# Patient Record
Sex: FEMALE | Race: BLACK OR AFRICAN AMERICAN | NOT HISPANIC OR LATINO | URBAN - METROPOLITAN AREA
[De-identification: names, ages, dates, MRNs, and addresses within clinical notes are randomized per-mention and may not be internally consistent; named-entity substitution may affect disease eponyms.]

---

## 2018-07-26 ENCOUNTER — EMERGENCY (EMERGENCY)
Facility: HOSPITAL | Age: 43
LOS: 1 days | Discharge: HOME | End: 2018-07-26
Attending: EMERGENCY MEDICINE | Admitting: EMERGENCY MEDICINE
Payer: COMMERCIAL

## 2018-07-26 VITALS
RESPIRATION RATE: 16 BRPM | DIASTOLIC BLOOD PRESSURE: 72 MMHG | HEART RATE: 79 BPM | SYSTOLIC BLOOD PRESSURE: 120 MMHG | OXYGEN SATURATION: 100 %

## 2018-07-26 VITALS
DIASTOLIC BLOOD PRESSURE: 81 MMHG | TEMPERATURE: 98 F | HEART RATE: 82 BPM | SYSTOLIC BLOOD PRESSURE: 141 MMHG | OXYGEN SATURATION: 100 % | RESPIRATION RATE: 18 BRPM

## 2018-07-26 LAB
ALBUMIN SERPL ELPH-MCNC: 4 G/DL — SIGNIFICANT CHANGE UP (ref 3.3–5)
ALP SERPL-CCNC: 84 U/L — SIGNIFICANT CHANGE UP (ref 40–120)
ALT FLD-CCNC: 14 U/L — SIGNIFICANT CHANGE UP (ref 4–33)
AST SERPL-CCNC: 25 U/L — SIGNIFICANT CHANGE UP (ref 4–32)
BASOPHILS # BLD AUTO: 0.02 K/UL — SIGNIFICANT CHANGE UP (ref 0–0.2)
BASOPHILS NFR BLD AUTO: 0.3 % — SIGNIFICANT CHANGE UP (ref 0–2)
BILIRUB SERPL-MCNC: 0.3 MG/DL — SIGNIFICANT CHANGE UP (ref 0.2–1.2)
BUN SERPL-MCNC: 16 MG/DL — SIGNIFICANT CHANGE UP (ref 7–23)
CALCIUM SERPL-MCNC: 9.2 MG/DL — SIGNIFICANT CHANGE UP (ref 8.4–10.5)
CHLORIDE SERPL-SCNC: 104 MMOL/L — SIGNIFICANT CHANGE UP (ref 98–107)
CO2 SERPL-SCNC: 24 MMOL/L — SIGNIFICANT CHANGE UP (ref 22–31)
CREAT SERPL-MCNC: 1.04 MG/DL — SIGNIFICANT CHANGE UP (ref 0.5–1.3)
D DIMER BLD IA.RAPID-MCNC: 177 NG/ML — SIGNIFICANT CHANGE UP
EOSINOPHIL # BLD AUTO: 0.06 K/UL — SIGNIFICANT CHANGE UP (ref 0–0.5)
EOSINOPHIL NFR BLD AUTO: 0.9 % — SIGNIFICANT CHANGE UP (ref 0–6)
GLUCOSE SERPL-MCNC: 81 MG/DL — SIGNIFICANT CHANGE UP (ref 70–99)
HCT VFR BLD CALC: 43.8 % — SIGNIFICANT CHANGE UP (ref 34.5–45)
HGB BLD-MCNC: 14.3 G/DL — SIGNIFICANT CHANGE UP (ref 11.5–15.5)
IMM GRANULOCYTES # BLD AUTO: 0.03 # — SIGNIFICANT CHANGE UP
IMM GRANULOCYTES NFR BLD AUTO: 0.4 % — SIGNIFICANT CHANGE UP (ref 0–1.5)
LYMPHOCYTES # BLD AUTO: 2.4 K/UL — SIGNIFICANT CHANGE UP (ref 1–3.3)
LYMPHOCYTES # BLD AUTO: 34.5 % — SIGNIFICANT CHANGE UP (ref 13–44)
MCHC RBC-ENTMCNC: 27.1 PG — SIGNIFICANT CHANGE UP (ref 27–34)
MCHC RBC-ENTMCNC: 32.6 % — SIGNIFICANT CHANGE UP (ref 32–36)
MCV RBC AUTO: 83.1 FL — SIGNIFICANT CHANGE UP (ref 80–100)
MONOCYTES # BLD AUTO: 0.73 K/UL — SIGNIFICANT CHANGE UP (ref 0–0.9)
MONOCYTES NFR BLD AUTO: 10.5 % — SIGNIFICANT CHANGE UP (ref 2–14)
NEUTROPHILS # BLD AUTO: 3.71 K/UL — SIGNIFICANT CHANGE UP (ref 1.8–7.4)
NEUTROPHILS NFR BLD AUTO: 53.4 % — SIGNIFICANT CHANGE UP (ref 43–77)
NRBC # FLD: 0 — SIGNIFICANT CHANGE UP
PLATELET # BLD AUTO: 286 K/UL — SIGNIFICANT CHANGE UP (ref 150–400)
PMV BLD: 9.3 FL — SIGNIFICANT CHANGE UP (ref 7–13)
POTASSIUM SERPL-MCNC: 4.9 MMOL/L — SIGNIFICANT CHANGE UP (ref 3.5–5.3)
POTASSIUM SERPL-SCNC: 4.9 MMOL/L — SIGNIFICANT CHANGE UP (ref 3.5–5.3)
PROT SERPL-MCNC: 8.2 G/DL — SIGNIFICANT CHANGE UP (ref 6–8.3)
RBC # BLD: 5.27 M/UL — HIGH (ref 3.8–5.2)
RBC # FLD: 14.1 % — SIGNIFICANT CHANGE UP (ref 10.3–14.5)
SODIUM SERPL-SCNC: 138 MMOL/L — SIGNIFICANT CHANGE UP (ref 135–145)
WBC # BLD: 6.95 K/UL — SIGNIFICANT CHANGE UP (ref 3.8–10.5)
WBC # FLD AUTO: 6.95 K/UL — SIGNIFICANT CHANGE UP (ref 3.8–10.5)

## 2018-07-26 PROCEDURE — 99284 EMERGENCY DEPT VISIT MOD MDM: CPT

## 2018-07-26 PROCEDURE — 71046 X-RAY EXAM CHEST 2 VIEWS: CPT | Mod: 26

## 2018-07-26 PROCEDURE — 71250 CT THORAX DX C-: CPT | Mod: 26

## 2018-07-26 RX ORDER — IPRATROPIUM/ALBUTEROL SULFATE 18-103MCG
3 AEROSOL WITH ADAPTER (GRAM) INHALATION ONCE
Qty: 0 | Refills: 0 | Status: COMPLETED | OUTPATIENT
Start: 2018-07-26 | End: 2018-07-26

## 2018-07-26 RX ORDER — KETOROLAC TROMETHAMINE 30 MG/ML
30 SYRINGE (ML) INJECTION ONCE
Qty: 0 | Refills: 0 | Status: DISCONTINUED | OUTPATIENT
Start: 2018-07-26 | End: 2018-07-26

## 2018-07-26 RX ADMIN — Medication 30 MILLIGRAM(S): at 15:42

## 2018-07-26 RX ADMIN — Medication 3 MILLILITER(S): at 15:42

## 2018-07-26 NOTE — ED ADULT TRIAGE NOTE - CHIEF COMPLAINT QUOTE
Pt c/o pain between the shoulder blades worse upon exertion, b/l leg swelling and chest pain x 5 days. Pt reports recent car trip to [place] 2 weeks ago. Resp even and unlabored. Non smoker, no OCP's.

## 2018-07-26 NOTE — ED PROVIDER NOTE - PROGRESS NOTE DETAILS
Symptoms improved w neb. CXR with Focal small indistinct nodular opacity in right lung base, recommended CT chest. ADRIANE Veloz MD MD CHO:  I received s/o on this pt.  Likely mild asthma exac.  No steroids per s/o, sx relived with one duoneb.  Pending ct chest d/t abn cxr.  Completed, will dc home with pcp f/u.

## 2018-07-26 NOTE — ED PROVIDER NOTE - MEDICAL DECISION MAKING DETAILS
43F with pleurtic upper back pain, cough and SOB.  No wheezing, h/o asthma, will give neb. Plan for CXR, low risk PE, plan for d-dimer. Consider infxn vs asthma exacerbation.

## 2018-07-26 NOTE — ED SUB INTERN NOTE - MEDICAL DECISION MAKING DETAILS
Differential includes asthma exacerbation vs. PE vs. musculoskeletal pain. Laboratory tests include CBC, CMP, urine pregnancy POCT, and D-dimer. Imaging includes CXR. 12-lead ECG to be performed. In the meantime, albuterol/ipratropium nebulizer for SOB is ordered with ketorolac for R back pain.

## 2018-07-26 NOTE — ED SUB INTERN NOTE - ENMT, MLM
Airway patent. Nasal mucosa clear. Mouth with normal mucosa. Throat has no vesicles, no oropharyngeal exudates and uvula is midline. Decreased voice volume.

## 2018-07-26 NOTE — ED ADULT NURSE REASSESSMENT NOTE - REASSESS COMMUNICATION
discharged with instructions by provider, sl removed intact prior to discharge/ED physician notified

## 2018-07-26 NOTE — ED ADULT NURSE REASSESSMENT NOTE - GENERAL PATIENT STATE
comfortable appearance/improvement verbalized/family/SO at bedside/states she feels alittle better than when she came in

## 2018-07-26 NOTE — ED SUB INTERN NOTE - CARDIAC, MLM
Normal rate, regular rhythm.  Heart sounds S1, S2.  No murmurs, rubs or gallops. No swelling in BLLE.

## 2018-07-26 NOTE — ED ADULT NURSE NOTE - OBJECTIVE STATEMENT
Patient reports intermittent CP associated with asthma attack. Patient used inhaler w/o relief. Patient currently able to breathe without difficulty, appears NAD. Patient's 12 lead EKG shows NSR, patient denies any significant cardiac hx. Patient's labs drawn, nebulizer tx given, awaiting results.

## 2018-07-26 NOTE — ED ADULT NURSE NOTE - CHPI ED SYMPTOMS NEG
no vomiting/no diaphoresis/no dizziness/no fever/no chills/no cough/no back pain/no syncope/no nausea

## 2018-07-26 NOTE — ED SUB INTERN NOTE - OBJECTIVE STATEMENT FT
Ms. Jimenez is a 43 y.o. F with a PMHx of asthma, chronic back/neck pain, and GERD, who presents with R upper back with SOB and cough since 4 days ago. She rates the pain as a sharp pain that comes and goes and is between 5-8/10. It does not radiate to the chest. She also reports developing a cough with varying productiveness since Monday, but has not noticed blood. She has had SOB since Monday and pain with deep breathing. Yesterday, she describes that she started losing her voice. She tried using her flovent and ventolin for her sx and reports moderate relief.  2 weeks ago, she went on an 18-hour car ride and developed BL leg swelling. She says this isn't unusual for her during long trips. She has also vomited 3x this week, but she attributes this to her longstanding GERD and this is also not unusual for her.

## 2018-07-26 NOTE — ED PROVIDER NOTE - OBJECTIVE STATEMENT
43F h/o asthma, chronic back pain, GERD presents with upper back pain  Pain started in upper back 4d ago, started at rest. Pain is intermittent, no radiation. Also with cough and SOB.   Tried using inhalers with minimal relief.    Pain is worse w movement of arm and deep breath  Reports recent long road trip, after which developed some b/l leg swelling, that has since resolved  No CP 43F h/o asthma, chronic back pain, GERD presents with upper back pain. Pain started in upper back 4d ago, started at rest. Pain is intermittent, no radiation. Also with cough and SOB. Tried using inhalers with minimal relief.  Pain is worse w movement of arm and deep breath. Reports recent long road trip, after which developed some b/l leg swelling, that has since resolved. No anterior CP, no fever.

## 2020-07-14 PROBLEM — J45.909 UNSPECIFIED ASTHMA, UNCOMPLICATED: Chronic | Status: ACTIVE | Noted: 2018-07-26

## 2020-08-11 ENCOUNTER — FORM ENCOUNTER (OUTPATIENT)
Age: 45
End: 2020-08-11

## 2020-08-12 ENCOUNTER — RESULT REVIEW (OUTPATIENT)
Age: 45
End: 2020-08-12

## 2020-08-12 ENCOUNTER — APPOINTMENT (OUTPATIENT)
Dept: OBGYN | Facility: CLINIC | Age: 45
End: 2020-08-12
Payer: COMMERCIAL

## 2020-08-12 ENCOUNTER — FORM ENCOUNTER (OUTPATIENT)
Age: 45
End: 2020-08-12

## 2020-08-12 PROBLEM — Z00.00 ENCOUNTER FOR PREVENTIVE HEALTH EXAMINATION: Status: ACTIVE | Noted: 2020-08-12

## 2020-08-12 PROCEDURE — 99386 PREV VISIT NEW AGE 40-64: CPT

## 2020-08-12 PROCEDURE — 99213 OFFICE O/P EST LOW 20 MIN: CPT | Mod: 25

## 2020-08-20 ENCOUNTER — RESULT REVIEW (OUTPATIENT)
Age: 45
End: 2020-08-20

## 2020-08-20 ENCOUNTER — APPOINTMENT (OUTPATIENT)
Dept: ULTRASOUND IMAGING | Facility: CLINIC | Age: 45
End: 2020-08-20
Payer: COMMERCIAL

## 2020-08-20 ENCOUNTER — OUTPATIENT (OUTPATIENT)
Dept: OUTPATIENT SERVICES | Facility: HOSPITAL | Age: 45
LOS: 1 days | End: 2020-08-20
Payer: COMMERCIAL

## 2020-08-20 ENCOUNTER — TRANSCRIPTION ENCOUNTER (OUTPATIENT)
Age: 45
End: 2020-08-20

## 2020-08-20 DIAGNOSIS — Z00.8 ENCOUNTER FOR OTHER GENERAL EXAMINATION: ICD-10-CM

## 2020-08-20 PROCEDURE — 76830 TRANSVAGINAL US NON-OB: CPT

## 2020-08-20 PROCEDURE — 76830 TRANSVAGINAL US NON-OB: CPT | Mod: 26

## 2020-08-20 PROCEDURE — 76856 US EXAM PELVIC COMPLETE: CPT | Mod: 26

## 2020-08-20 PROCEDURE — 76856 US EXAM PELVIC COMPLETE: CPT

## 2020-09-24 ENCOUNTER — FORM ENCOUNTER (OUTPATIENT)
Age: 45
End: 2020-09-24

## 2020-09-25 ENCOUNTER — APPOINTMENT (OUTPATIENT)
Dept: OBGYN | Facility: CLINIC | Age: 45
End: 2020-09-25
Payer: COMMERCIAL

## 2020-09-25 ENCOUNTER — RESULT REVIEW (OUTPATIENT)
Age: 45
End: 2020-09-25

## 2020-09-25 PROCEDURE — 76831 ECHO EXAM UTERUS: CPT

## 2020-09-25 PROCEDURE — 81025 URINE PREGNANCY TEST: CPT

## 2020-09-25 PROCEDURE — 58100 BIOPSY OF UTERUS LINING: CPT

## 2020-10-09 ENCOUNTER — OUTPATIENT (OUTPATIENT)
Dept: OUTPATIENT SERVICES | Facility: HOSPITAL | Age: 45
LOS: 1 days | End: 2020-10-09
Payer: COMMERCIAL

## 2020-10-09 VITALS
WEIGHT: 272.93 LBS | RESPIRATION RATE: 16 BRPM | TEMPERATURE: 98 F | SYSTOLIC BLOOD PRESSURE: 116 MMHG | OXYGEN SATURATION: 99 % | DIASTOLIC BLOOD PRESSURE: 73 MMHG | HEART RATE: 72 BPM | HEIGHT: 67 IN

## 2020-10-09 DIAGNOSIS — N84.0 POLYP OF CORPUS UTERI: ICD-10-CM

## 2020-10-09 DIAGNOSIS — M51.26 OTHER INTERVERTEBRAL DISC DISPLACEMENT, LUMBAR REGION: Chronic | ICD-10-CM

## 2020-10-09 DIAGNOSIS — Z01.818 ENCOUNTER FOR OTHER PREPROCEDURAL EXAMINATION: ICD-10-CM

## 2020-10-09 DIAGNOSIS — Z98.890 OTHER SPECIFIED POSTPROCEDURAL STATES: Chronic | ICD-10-CM

## 2020-10-09 LAB
ANION GAP SERPL CALC-SCNC: 11 MMOL/L — SIGNIFICANT CHANGE UP (ref 5–17)
BUN SERPL-MCNC: 9 MG/DL — SIGNIFICANT CHANGE UP (ref 7–23)
CALCIUM SERPL-MCNC: 9.2 MG/DL — SIGNIFICANT CHANGE UP (ref 8.4–10.5)
CHLORIDE SERPL-SCNC: 102 MMOL/L — SIGNIFICANT CHANGE UP (ref 96–108)
CO2 SERPL-SCNC: 24 MMOL/L — SIGNIFICANT CHANGE UP (ref 22–31)
CREAT SERPL-MCNC: 0.71 MG/DL — SIGNIFICANT CHANGE UP (ref 0.5–1.3)
GLUCOSE SERPL-MCNC: 92 MG/DL — SIGNIFICANT CHANGE UP (ref 70–99)
HCT VFR BLD CALC: 45.4 % — HIGH (ref 34.5–45)
HGB BLD-MCNC: 14.7 G/DL — SIGNIFICANT CHANGE UP (ref 11.5–15.5)
MCHC RBC-ENTMCNC: 27.7 PG — SIGNIFICANT CHANGE UP (ref 27–34)
MCHC RBC-ENTMCNC: 32.4 GM/DL — SIGNIFICANT CHANGE UP (ref 32–36)
MCV RBC AUTO: 85.5 FL — SIGNIFICANT CHANGE UP (ref 80–100)
NRBC # BLD: 0 /100 WBCS — SIGNIFICANT CHANGE UP (ref 0–0)
PLATELET # BLD AUTO: 295 K/UL — SIGNIFICANT CHANGE UP (ref 150–400)
POTASSIUM SERPL-MCNC: 3.6 MMOL/L — SIGNIFICANT CHANGE UP (ref 3.5–5.3)
POTASSIUM SERPL-SCNC: 3.6 MMOL/L — SIGNIFICANT CHANGE UP (ref 3.5–5.3)
RBC # BLD: 5.31 M/UL — HIGH (ref 3.8–5.2)
RBC # FLD: 14.4 % — SIGNIFICANT CHANGE UP (ref 10.3–14.5)
SARS-COV-2 RNA SPEC QL NAA+PROBE: SIGNIFICANT CHANGE UP
SODIUM SERPL-SCNC: 137 MMOL/L — SIGNIFICANT CHANGE UP (ref 135–145)
WBC # BLD: 6.59 K/UL — SIGNIFICANT CHANGE UP (ref 3.8–10.5)
WBC # FLD AUTO: 6.59 K/UL — SIGNIFICANT CHANGE UP (ref 3.8–10.5)

## 2020-10-09 PROCEDURE — G0463: CPT

## 2020-10-09 PROCEDURE — 85027 COMPLETE CBC AUTOMATED: CPT

## 2020-10-09 PROCEDURE — 80048 BASIC METABOLIC PNL TOTAL CA: CPT

## 2020-10-09 PROCEDURE — U0003: CPT

## 2020-10-09 RX ORDER — SODIUM CHLORIDE 9 MG/ML
3 INJECTION INTRAMUSCULAR; INTRAVENOUS; SUBCUTANEOUS EVERY 8 HOURS
Refills: 0 | Status: DISCONTINUED | OUTPATIENT
Start: 2020-10-12 | End: 2020-10-26

## 2020-10-09 RX ORDER — LIDOCAINE HCL 20 MG/ML
0.2 VIAL (ML) INJECTION ONCE
Refills: 0 | Status: DISCONTINUED | OUTPATIENT
Start: 2020-10-12 | End: 2020-10-26

## 2020-10-09 NOTE — H&P PST ADULT - NSICDXPASTMEDICALHX_GEN_ALL_CORE_FT
PAST MEDICAL HISTORY:  Anemia in past secondary to vaginal bleeding    Asthma     Fluid retention     Lumbar herniated disc treated with injections    Morbid obesity     TORSTEN (obstructive sleep apnea) possible- pt noncompliant with testing    Uterine polyp

## 2020-10-09 NOTE — H&P PST ADULT - NSICDXPASTSURGICALHX_GEN_ALL_CORE_FT
PAST SURGICAL HISTORY:  Lumbar disc herniation Injection procedure under anesthesia    S/P D&C (status post dilation and curettage)

## 2020-10-09 NOTE — H&P PST ADULT - ATTENDING COMMENTS
Pt consented for D+C, diagnostic hysteroscopy, possible operative hysteroscopy and polypectomy. All questions answered.

## 2020-10-09 NOTE — H&P PST ADULT - HISTORY OF PRESENT ILLNESS
Pt is a 45y.o. BF  with LMP 2020. Pt states she has h/o fibroids and has been experiencing prolonged vaginal bleeding. She states she was bleeding x 3 months until it recently stopped. This has happened to her once in the past with secondary anemia requiring a transfusion and was treated with a D&C.  Pt states she had a vaginal sonogram, a hysterosonogram and a uterine biopsy. She is unsure if she has a polyp. She is scheduled for a diagnostic hysteroscopy, possible operative hysteroscopy and polypectomy with Dr. Fall on 10/12/2020.    Covid swab done at [facility].

## 2020-10-09 NOTE — H&P PST ADULT - NSICDXPROBLEM_GEN_ALL_CORE_FT
PROBLEM DIAGNOSES  Problem: Uterine polyp  Assessment and Plan: Pt is scheduled for a D&C, diagnostic hysteroscopy, possible operative hysteroscopy polypectomy  NPO after 11PM  Pt advised to use inhalers in AM DOS and to hold HCTZ pre-op

## 2020-10-11 ENCOUNTER — FORM ENCOUNTER (OUTPATIENT)
Age: 45
End: 2020-10-11

## 2020-10-11 ENCOUNTER — TRANSCRIPTION ENCOUNTER (OUTPATIENT)
Age: 45
End: 2020-10-11

## 2020-10-12 ENCOUNTER — APPOINTMENT (OUTPATIENT)
Dept: OBGYN | Facility: HOSPITAL | Age: 45
End: 2020-10-12

## 2020-10-12 ENCOUNTER — RESULT REVIEW (OUTPATIENT)
Age: 45
End: 2020-10-12

## 2020-10-12 ENCOUNTER — OUTPATIENT (OUTPATIENT)
Dept: OUTPATIENT SERVICES | Facility: HOSPITAL | Age: 45
LOS: 1 days | End: 2020-10-12
Payer: COMMERCIAL

## 2020-10-12 VITALS — DIASTOLIC BLOOD PRESSURE: 68 MMHG | HEART RATE: 59 BPM | OXYGEN SATURATION: 100 % | SYSTOLIC BLOOD PRESSURE: 120 MMHG

## 2020-10-12 VITALS
OXYGEN SATURATION: 98 % | HEART RATE: 75 BPM | WEIGHT: 272.93 LBS | HEIGHT: 67 IN | TEMPERATURE: 97 F | RESPIRATION RATE: 16 BRPM | DIASTOLIC BLOOD PRESSURE: 71 MMHG | SYSTOLIC BLOOD PRESSURE: 133 MMHG

## 2020-10-12 DIAGNOSIS — Z01.818 ENCOUNTER FOR OTHER PREPROCEDURAL EXAMINATION: ICD-10-CM

## 2020-10-12 DIAGNOSIS — N84.0 POLYP OF CORPUS UTERI: ICD-10-CM

## 2020-10-12 DIAGNOSIS — Z98.890 OTHER SPECIFIED POSTPROCEDURAL STATES: Chronic | ICD-10-CM

## 2020-10-12 DIAGNOSIS — M51.26 OTHER INTERVERTEBRAL DISC DISPLACEMENT, LUMBAR REGION: Chronic | ICD-10-CM

## 2020-10-12 PROCEDURE — 88305 TISSUE EXAM BY PATHOLOGIST: CPT | Mod: 26

## 2020-10-12 PROCEDURE — 58558 HYSTEROSCOPY BIOPSY: CPT

## 2020-10-12 PROCEDURE — 88305 TISSUE EXAM BY PATHOLOGIST: CPT

## 2020-10-12 PROCEDURE — ZZZZZ: CPT

## 2020-10-12 RX ORDER — METOCLOPRAMIDE HCL 10 MG
10 TABLET ORAL ONCE
Refills: 0 | Status: DISCONTINUED | OUTPATIENT
Start: 2020-10-12 | End: 2020-10-26

## 2020-10-12 RX ORDER — ALBUTEROL 90 UG/1
0 AEROSOL, METERED ORAL
Qty: 0 | Refills: 0 | DISCHARGE

## 2020-10-12 RX ORDER — FENTANYL CITRATE 50 UG/ML
25 INJECTION INTRAVENOUS
Refills: 0 | Status: DISCONTINUED | OUTPATIENT
Start: 2020-10-12 | End: 2020-10-12

## 2020-10-12 RX ORDER — ACETAMINOPHEN 500 MG
3 TABLET ORAL
Qty: 0 | Refills: 0 | DISCHARGE

## 2020-10-12 RX ORDER — IBUPROFEN 200 MG
3 TABLET ORAL
Qty: 0 | Refills: 0 | DISCHARGE

## 2020-10-12 RX ORDER — ONDANSETRON 8 MG/1
4 TABLET, FILM COATED ORAL ONCE
Refills: 0 | Status: COMPLETED | OUTPATIENT
Start: 2020-10-12 | End: 2020-10-12

## 2020-10-12 RX ADMIN — ONDANSETRON 4 MILLIGRAM(S): 8 TABLET, FILM COATED ORAL at 09:52

## 2020-10-12 NOTE — ASU PATIENT PROFILE, ADULT - PMH
Anemia  in past secondary to vaginal bleeding  Asthma    Fluid retention    Lumbar herniated disc  treated with injections  Morbid obesity    TORSTEN (obstructive sleep apnea)  possible- pt noncompliant with testing  Uterine polyp

## 2020-10-12 NOTE — BRIEF OPERATIVE NOTE - NSICDXBRIEFPROCEDURE_GEN_ALL_CORE_FT
PROCEDURES:  Diagnostic hysteroscopy for abnormal uterine bleeding 12-Oct-2020 09:34:42  Amanda Hyman

## 2020-10-12 NOTE — ASU PREOP CHECKLIST - IV STARTED
Gen: NAD  Head: NCAT  HEENT: PERRL, MMM, normal conjunctiva, anicteric, neck supple  Lung: CTAB, no adventitious sounds  CV: RRR, no murmurs, rubs or gallops  Abd: soft, NTND, no rebound or guarding, no CVAT  MSK: R ankle TTP over lateral malleolus with swelling  Neuro: No focal neurologic deficits. CN II-XII grossly intact. 5/5 strength and normal sensation in all extremities.  Skin: Warm and dry, no evidence of rash  Psych: normal mood and affect yes

## 2020-10-12 NOTE — ASU DISCHARGE PLAN (ADULT/PEDIATRIC) - CALL YOUR DOCTOR IF YOU HAVE ANY OF THE FOLLOWING:
Bleeding that does not stop/Fever greater than (need to indicate Fahrenheit or Celsius)/Pain not relieved by Medications/Unable to urinate

## 2020-10-12 NOTE — BRIEF OPERATIVE NOTE - OPERATION/FINDINGS
on hysteroscopy 1.5 cm polyp at the fundus removed using hysteroscopy polyp forceps. additional subcentimeter polyp on the anterior wall of the uterus in the lower uterine segment removed w/ hysteroscopic forceps

## 2020-10-12 NOTE — ASU PATIENT PROFILE, ADULT - PSH
Lumbar disc herniation  Injection procedure under anesthesia  S/P D&C (status post dilation and curettage)

## 2020-10-12 NOTE — ASU PATIENT PROFILE, ADULT - BRADEN SCORE (IF 18 OR LESS ACTIVATE SKIN INJURY RISK INCREASED GUIDELINE), MLM
Patient: Awed Ismail    Procedure(s):  Anesthesia Offsite Coverage Liver Ablation @1100    Diagnosis:Hepatocellular Carcinoma  Diagnosis Additional Information: No value filed.    Anesthesia Type:  No value filed.    Note:  Anesthesia Post Evaluation    Patient location during evaluation: PACU  Patient participation: Able to fully participate in evaluation  Level of consciousness: awake and alert  Pain management: adequate  Airway patency: patent  Cardiovascular status: acceptable and hemodynamically stable  Respiratory status: acceptable  Hydration status: acceptable  PONV: none     Anesthetic complications: None          Last vitals:  Vitals:    04/19/17 0950   BP: 172/78   Resp: 16   Temp: 36.7  C (98.1  F)   SpO2: 97%         Electronically Signed By: Young Keller MD  April 19, 2017  2:20 PM  
23

## 2020-10-12 NOTE — ASU DISCHARGE PLAN (ADULT/PEDIATRIC) - CARE PROVIDER_API CALL
Zacarias Fall  [facility]  [address] [place] [address], [address]  [place] [address]  Phone: [phone]  Fax: [phone]  Follow Up Time:

## 2020-10-12 NOTE — ASU DISCHARGE PLAN (ADULT/PEDIATRIC) - ASU DC SPECIAL INSTRUCTIONSFT
Nothing per vagina until post op check- no douching, tampons, sitz baths, sexual intercourse.  Call your doctor and go to the ER if you experience severe discomfort, chest pain, shortness of breath, fever greater than 100.4, of excessive vaginal bleeding greater than 1 pad/hr for 2 consecutive hours.  Take over the counter and prescribed medications for pain control as directed. Follow up with your doctor in 2 weeks.

## 2020-10-14 ENCOUNTER — FORM ENCOUNTER (OUTPATIENT)
Age: 45
End: 2020-10-14

## 2020-10-15 LAB — SURGICAL PATHOLOGY STUDY: SIGNIFICANT CHANGE UP

## 2020-10-27 NOTE — ASU PATIENT PROFILE, ADULT - TRANSFUSION REACTION, PREVIOUS, PROFILE
Problem: SAFETY  Goal: Free from accidental physical injury  Outcome: Ongoing     Problem: DAILY CARE  Goal: Daily care needs are met  Outcome: Ongoing     Problem: PAIN  Goal: Patient's pain/discomfort is manageable  Outcome: Ongoing no

## 2020-11-03 ENCOUNTER — FORM ENCOUNTER (OUTPATIENT)
Age: 45
End: 2020-11-03

## 2020-11-04 ENCOUNTER — APPOINTMENT (OUTPATIENT)
Dept: OBGYN | Facility: CLINIC | Age: 45
End: 2020-11-04
Payer: COMMERCIAL

## 2020-11-04 PROCEDURE — 99213 OFFICE O/P EST LOW 20 MIN: CPT

## 2020-11-04 PROCEDURE — 99072 ADDL SUPL MATRL&STAF TM PHE: CPT

## 2021-01-10 NOTE — ED ADULT TRIAGE NOTE - NS ED NURSE BANDS TYPE
- General Info


Date of Service: 01/10/21


Admission Dx/Problem (Free Text): 





pt admitted for weakness, dehydration, pneumonia, CHF exacerbation





Functional Status: Reports: Incentive Spirometry





- Review of Systems


General: Reports: No Symptoms


HEENT: Reports: No Symptoms


Pulmonary: Reports: Shortness of Breath (on ambulation)


Cardiovascular: Reports: Dyspnea on Exertion.  Denies: Chest Pain


Gastrointestinal: Reports: No Symptoms


Musculoskeletal: Reports: No Symptoms


Skin: Reports: No Symptoms


Neurological: Reports: No Symptoms





- Patient Data


Vitals - Most Recent: 


                                Last Vital Signs











Temp  36.3 C   01/10/21 08:00


 


Pulse  77   01/10/21 08:48


 


Resp  18   01/10/21 08:00


 


BP  127/58 L  01/10/21 08:48


 


Pulse Ox  94 L  01/10/21 08:00











Weight - Most Recent: 77.678 kg


I&O - Last 24 Hours: 


                                 Intake & Output











 01/09/21 01/10/21 01/10/21





 22:59 06:59 14:59


 


Intake Total 720 150 


 


Output Total 500 700 


 


Balance 220 -550 











Med Orders - Current: 


                               Current Medications





Acetaminophen (Tylenol Arthritis Pain)  1,300 mg PO TID PRN


   PRN Reason: Pain


   Last Admin: 01/10/21 08:44 Dose:  1,300 mg


   Documented by: 


Albuterol (Proventil Neb Soln)  2.5 mg NEB Q4H PRN


   PRN Reason: short of breath


   Last Admin: 01/06/21 08:48 Dose:  2.5 mg


   Documented by: 


Aspirin (Aspirin)  81 mg PO DAILY [facility]


   Last Admin: 01/10/21 08:44 Dose:  81 mg


   Documented by: 


Atenolol (Tenormin)  12.5 mg PO DAILY [facility]


   Last Admin: 01/10/21 08:48 Dose:  12.5 mg


   Documented by: 


Atorvastatin Calcium (Lipitor)  10 mg PO BEDTIME [facility]


   Last Admin: 01/09/21 20:00 Dose:  10 mg


   Documented by: 


Calamine/Phenol (Calmoseptine)  0 gm TOP QID PRN


   PRN Reason: Rash


   Last Admin: 01/03/21 10:12 Dose:  1 applic


   Documented by: 


Levofloxacin (Levaquin)  750 mg PO Q48H [facility]


   Last Admin: 01/09/21 08:16 Dose:  750 mg


   Documented by: 


Meclizine HCl (Antivert)  12.5 mg PO DAILY PRN


   PRN Reason: Dizziness


Nystatin (Nystop)  1 gm TOP BID [facility]


   Last Admin: 01/10/21 08:44 Dose:  1 applic


   Documented by: 


Ondansetron HCl (Zofran)  4 mg IVPUSH Q6H PRN


   PRN Reason: Nausea/Vomiting


   Last Admin: 01/07/21 09:54 Dose:  4 mg


   Documented by: 


Pantoprazole Sodium (Protonix***)  40 mg PO ACBREAKFAST [facility]


   Last Admin: 01/10/21 08:44 Dose:  40 mg


   Documented by: 


Sodium Chloride (Saline Flush)  10 ml FLUSH ASDIRECTED PRN


   PRN Reason: Keep Vein Open


   Last Admin: 01/06/21 19:42 Dose:  10 ml


   Documented by: 


Tiotropium Bromide (Spiriva Handihaler)  18 mcg INH DAILY [facility]


   Last Admin: 01/10/21 08:44 Dose:  1 puff


   Documented by: 


Trolamine Salicylate (Aspercreme 10%)  0 gm TOP Q1H PRN


   PRN Reason: Other


   Last Admin: 01/03/21 14:13 Dose:  1 applic


   Documented by: 





Discontinued Medications





Acetaminophen (Tylenol Arthritis Pain)  1,300 mg PO TID [facility]


   Last Admin: 01/03/21 18:58 Dose:  Not Given


   Documented by: 


Albuterol/Ipratropium (Duoneb 3.0-0.5 Mg/3 Ml) Confirm Administered Dose 3 ml 

.ROUTE .STK-MED ONE


   Stop: 01/02/21 19:05


   Last Admin: 01/02/21 19:30 Dose:  Not Given


   Documented by: 


Albuterol/Ipratropium (Duoneb 3.0-0.5 Mg/3 Ml)  3 ml NEB Q2H ONE


   Stop: 01/02/21 19:01


   Last Admin: 01/02/21 19:00 Dose:  3 ml


   Documented by: 


Atenolol (Tenormin)  25 mg PO DAILY [facility]


   Last Admin: 01/09/21 08:16 Dose:  25 mg


   Documented by: 


Furosemide (Lasix)  20 mg IVPUSH ONETIME ONE


   Stop: 01/03/21 16:11


   Last Admin: 01/03/21 16:40 Dose:  20 mg


   Documented by: 


Furosemide (Lasix)  20 mg PO ONETIME ONE


   Stop: 01/05/21 12:24


   Last Admin: 01/05/21 13:40 Dose:  20 mg


   Documented by: 


Furosemide (Lasix)  40 mg IVPUSH NOW ONE


   Stop: 01/05/21 16:21


   Last Admin: 01/05/21 16:31 Dose:  40 mg


   Documented by: 


Furosemide (Lasix)  20 mg IVPUSH BIDDIURETIC [facility]


   Last Admin: 01/06/21 07:43 Dose:  20 mg


   Documented by: 


Furosemide (Lasix)  40 mg PO BID CORNEL


Furosemide (Lasix)  40 mg PO BID@0800,1400 [facility]


   Last Admin: 01/07/21 13:41 Dose:  Not Given


   Documented by: 


Sodium Chloride (Normal Saline)  1,000 mls @ 75 mls/hr IV ASDIRECTED [facility]


   Last Admin: 01/03/21 10:08 Dose:  100 mls/hr


   Documented by: 


Levofloxacin/Dextrose 750 mg/ (Levofloxacin/Dextrose)  300 mls @ 100 mls/hr IV 

Q24H [facility]


   Stop: 01/07/21 09:00


   Last Admin: 01/03/21 10:10 Dose:  100 mls/hr


   Documented by: 


Sodium Chloride (Normal Saline)  500 mls @ 100 mls/hr IV .BOLUS ONE


   Stop: 01/03/21 13:44


   Last Admin: 01/03/21 09:10 Dose:  500 mls/hr


   Documented by: 


Levofloxacin/Dextrose (Levaquin In D5w 750 Mg/150 Ml) Confirm Administered Dose 

150 mls @ as directed IV .STK-MED ONE


   Stop: 01/03/21 09:54


   Last Admin: 01/03/21 10:10 Dose:  Not Given


   Documented by: 


Sodium Chloride (Normal Saline)  1,000 mls @ 75 mls/hr IV ASDIRECTED [facility]


   Last Admin: 01/03/21 11:40 Dose:  75 mls/hr


   Documented by: 


Levofloxacin/Dextrose 750 mg/ (Levofloxacin/Dextrose)  300 mls @ 100 mls/hr IV 

Q48H [facility]


   Stop: 01/07/21 09:00


Loperamide HCl (Imodium)  4 mg PO ONETIME ONE


   Stop: 01/03/21 08:49


   Last Admin: 01/03/21 09:17 Dose:  4 mg


   Documented by: 


Magnesium Oxide (Magnesium Oxide)  400 mg PO ONETIME ONE


   Stop: 01/06/21 11:20


   Last Admin: 01/06/21 14:18 Dose:  400 mg


   Documented by: 


Magnesium Oxide (Magnesium Oxide) Confirm Administered Dose 400 mg .ROUTE .STK-

MED ONE


   Stop: 01/06/21 14:19


   Last Admin: 01/06/21 14:23 Dose:  Not Given


   Documented by: 


Meclizine HCl (Antivert)  12.5 mg PO ONETIME ONE


   Stop: 01/06/21 09:46


   Last Admin: 01/06/21 10:41 Dose:  12.5 mg


   Documented by: 


Meclizine HCl (Antivert)  12.5 mg PO DAILY PRN


   PRN Reason: Dizziness


   Last Admin: 01/09/21 10:20 Dose:  12.5 mg


   Documented by: 


Meclizine HCl (Antivert) Confirm Administered Dose 25 mg .ROUTE .STK-MED ONE


   Stop: 01/10/21 08:44


   Last Admin: 01/10/21 11:02 Dose:  Not Given


   Documented by: 


Ondansetron HCl (Zofran) Confirm Administered Dose 4 mg .ROUTE .STK-MED ONE


   Stop: 01/02/21 19:13


   Last Admin: 01/02/21 19:05 Dose:  Not Given


   Documented by: 











- Exam


Quality Assessment: Supplemental Oxygen


General: Alert, Oriented, Cooperative, No Acute Distress


HEENT: Pupils Equal, Pupils Reactive, EOMI


Lungs: Clear to Auscultation, Normal Respiratory Effort


Cardiovascular: Regular Rate, Irregular Rhythm


Back Exam: Normal Inspection, Full Range of Motion


Extremities: Normal Inspection, Normal Range of Motion.  No: Pedal Edema


Neurological: No New Focal Deficit


Psy/Mental Status: Alert, Normal Affect





Sepsis Event Note





- Evaluation


Sepsis Screening Result: No Definite Risk





- Focused Exam


Vital Signs: 


                                   Vital Signs











  Temp Pulse Pulse Resp BP BP BP


 


 01/10/21 08:48   77    127/58 L  


 


 01/10/21 08:00  36.3 C   77  18   127/58 L 


 


 01/10/21 05:09  36.6 C   62  18    108/55 L


 


 01/10/21 02:00    71  18   














  Pulse Ox


 


 01/10/21 08:48 


 


 01/10/21 08:00  94 L


 


 01/10/21 05:09  90 L


 


 01/10/21 02:00 














- Problem List & Annotations


(1) Tachycardia


SNOMED Code(s): 8347328


   Code(s): R00.0 - TACHYCARDIA, UNSPECIFIED   Status: Acute   Priority: Medium 

 Current Visit: Yes   





(2) Acute respiratory failure with hypoxia


SNOMED Code(s): 33493246, 107044252


   Code(s): J96.01 - ACUTE RESPIRATORY FAILURE WITH HYPOXIA   Status: Acute   

Priority: High   Current Visit: Yes   





(3) Pneumonia


SNOMED Code(s): 830721184


   Code(s): J18.9 - PNEUMONIA, UNSPECIFIED ORGANISM   Status: Acute   Priority: 

High   Current Visit: Yes   


Qualifiers: 


   Pneumonia type: due to unspecified organism   Laterality: right   Lung 

location: lower lobe of lung   Qualified Code(s): J18.9 - Pneumonia, unspecified

organism   





(4) Congestive heart failure


SNOMED Code(s): 63716062


   Code(s): I50.9 - HEART FAILURE, UNSPECIFIED   Status: Acute   Priority: 

Medium   Current Visit: No   Onset Date: ~03/19/18   


Qualifiers: 


   Heart failure type: systolic   Heart failure chronicity: acute on chronic   

Qualified Code(s): I50.23 - Acute on chronic systolic (congestive) heart failure

  


Annotation/Comment:: 3/19/18 - 1. Congestive heart failure, 2. Weakness, 3. Mild

dehydration   





(5) Syncope and collapse


SNOMED Code(s): 338186493


   Code(s): R55 - SYNCOPE AND COLLAPSE   Status: Acute   Priority: Medium   

Current Visit: Yes   





(6) NUNO (acute kidney injury)


SNOMED Code(s): 03414229, 46532315


   Code(s): N17.9 - ACUTE KIDNEY FAILURE, UNSPECIFIED   Status: Acute   

Priority: Medium   Current Visit: Yes   





- Problem List Review


Problem List Initiated/Reviewed/Updated: Yes





- My Orders


Last 24 Hours: 


My Active Orders





01/10/21 08:00


Meclizine [Antivert]   12.5 mg PO DAILY PRN 


atenoloL [Tenormin]   12.5 mg PO DAILY 














- Plan


Plan:: 








1- PNA:  continue Levaquin 750 mg PO q 36-48 hrs due to CKD for 5 doses. Resolv

ing.


2- Acute Respiratory failure with Acute hypoxia: secondary to pneumonia and CHF.

 O2 Supply to keep SpO2 >94%.  diuretics were started on 1/4 but held off on 1/7

 due to an episode of hypotension and syncope. albuterol nebs PRN. incentive 

spirometry. She is on 4 lit of O2 at home.





3- Acute on Chronic CHF: she does need diuretics but holding on this till vitals

 stabilize and Cr/GFR improves. daily weights. 172.4lb today. daily check of 

kidney function. 


4- Sinus tachycardia: EKG showed NSR tachycardia. probably 2/2 PNA and CHF. Home

 dose of atenolol was started on 1/06. Will decreased to 12.5mg PO ON 1/10/21


5- Vertigo and dizziness:  Meclizine 12.5mg PO as needed


6- Syncope on 1/7 - resolved- : probably 2/2 cardiac event, NSTEMI, or 

dehydration from diuresis. continue to hold on diuretics. and continue to 

monitor. 


7- Hypotension: 2/2 possible a cardiac event. Stabilized with cautious hydration

 and BP meds adjustment.


8- Deconditioning: due to above medical problems and morbid obesity. Requires 

additional nurse care assistance. 


9- Legal Blind: natural tear eye drops as needed





Dispo:


, PT and OT eval for need for placement, DMEs and medical 

assistance once stable . Name band;

## 2021-09-09 ENCOUNTER — FORM ENCOUNTER (OUTPATIENT)
Age: 46
End: 2021-09-09

## 2021-09-13 PROBLEM — G47.33 OBSTRUCTIVE SLEEP APNEA (ADULT) (PEDIATRIC): Chronic | Status: ACTIVE | Noted: 2020-10-09

## 2021-09-13 PROBLEM — R60.9 EDEMA, UNSPECIFIED: Chronic | Status: ACTIVE | Noted: 2020-10-09

## 2021-09-13 PROBLEM — D64.9 ANEMIA, UNSPECIFIED: Chronic | Status: ACTIVE | Noted: 2020-10-09

## 2021-09-13 PROBLEM — M51.26 OTHER INTERVERTEBRAL DISC DISPLACEMENT, LUMBAR REGION: Chronic | Status: ACTIVE | Noted: 2020-10-09

## 2021-09-13 PROBLEM — E66.01 MORBID (SEVERE) OBESITY DUE TO EXCESS CALORIES: Chronic | Status: ACTIVE | Noted: 2020-10-09

## 2021-09-13 PROBLEM — N84.0 POLYP OF CORPUS UTERI: Chronic | Status: ACTIVE | Noted: 2020-10-09

## 2021-10-11 ENCOUNTER — FORM ENCOUNTER (OUTPATIENT)
Age: 46
End: 2021-10-11

## 2021-10-12 ENCOUNTER — APPOINTMENT (OUTPATIENT)
Dept: OBGYN | Facility: CLINIC | Age: 46
End: 2021-10-12
Payer: COMMERCIAL

## 2021-10-12 VITALS
DIASTOLIC BLOOD PRESSURE: 80 MMHG | HEIGHT: 67 IN | SYSTOLIC BLOOD PRESSURE: 110 MMHG | BODY MASS INDEX: 40.18 KG/M2 | WEIGHT: 256 LBS

## 2021-10-12 PROCEDURE — XXXXX: CPT

## 2021-10-15 ENCOUNTER — NON-APPOINTMENT (OUTPATIENT)
Age: 46
End: 2021-10-15

## 2021-10-15 DIAGNOSIS — E78.5 HYPERLIPIDEMIA, UNSPECIFIED: ICD-10-CM

## 2021-10-15 DIAGNOSIS — N93.9 ABNORMAL UTERINE AND VAGINAL BLEEDING, UNSPECIFIED: ICD-10-CM

## 2021-10-15 DIAGNOSIS — N84.0 POLYP OF CORPUS UTERI: ICD-10-CM

## 2021-10-15 DIAGNOSIS — Z87.09 PERSONAL HISTORY OF OTHER DISEASES OF THE RESPIRATORY SYSTEM: ICD-10-CM

## 2021-10-15 DIAGNOSIS — Z86.018 PERSONAL HISTORY OF OTHER BENIGN NEOPLASM: ICD-10-CM

## 2021-10-15 LAB — CYTOLOGY CVX/VAG DOC THIN PREP: NORMAL

## 2021-10-15 RX ORDER — HYDROCHLOROTHIAZIDE 12.5 MG/1
TABLET ORAL
Refills: 0 | Status: ACTIVE | COMMUNITY

## 2021-10-15 RX ORDER — ALBUTEROL SULFATE 90 UG/1
AEROSOL, METERED RESPIRATORY (INHALATION)
Refills: 0 | Status: ACTIVE | COMMUNITY

## 2021-12-13 ENCOUNTER — APPOINTMENT (OUTPATIENT)
Dept: ULTRASOUND IMAGING | Facility: IMAGING CENTER | Age: 46
End: 2021-12-13
Payer: COMMERCIAL

## 2021-12-13 ENCOUNTER — RESULT REVIEW (OUTPATIENT)
Age: 46
End: 2021-12-13

## 2021-12-13 ENCOUNTER — OUTPATIENT (OUTPATIENT)
Dept: OUTPATIENT SERVICES | Facility: HOSPITAL | Age: 46
LOS: 1 days | End: 2021-12-13
Payer: COMMERCIAL

## 2021-12-13 ENCOUNTER — APPOINTMENT (OUTPATIENT)
Dept: MAMMOGRAPHY | Facility: IMAGING CENTER | Age: 46
End: 2021-12-13
Payer: COMMERCIAL

## 2021-12-13 DIAGNOSIS — Z00.8 ENCOUNTER FOR OTHER GENERAL EXAMINATION: ICD-10-CM

## 2021-12-13 DIAGNOSIS — M51.26 OTHER INTERVERTEBRAL DISC DISPLACEMENT, LUMBAR REGION: Chronic | ICD-10-CM

## 2021-12-13 DIAGNOSIS — Z98.890 OTHER SPECIFIED POSTPROCEDURAL STATES: Chronic | ICD-10-CM

## 2021-12-13 PROCEDURE — 76830 TRANSVAGINAL US NON-OB: CPT | Mod: 26

## 2021-12-13 PROCEDURE — 76856 US EXAM PELVIC COMPLETE: CPT | Mod: 26

## 2021-12-13 PROCEDURE — 76641 ULTRASOUND BREAST COMPLETE: CPT | Mod: 26,RT

## 2021-12-13 PROCEDURE — 76641 ULTRASOUND BREAST COMPLETE: CPT

## 2021-12-13 PROCEDURE — 77063 BREAST TOMOSYNTHESIS BI: CPT | Mod: 26

## 2021-12-13 PROCEDURE — 77067 SCR MAMMO BI INCL CAD: CPT | Mod: 26

## 2021-12-13 PROCEDURE — 76641 ULTRASOUND BREAST COMPLETE: CPT | Mod: 26,LT

## 2021-12-13 PROCEDURE — 76830 TRANSVAGINAL US NON-OB: CPT

## 2021-12-13 PROCEDURE — 76856 US EXAM PELVIC COMPLETE: CPT

## 2021-12-13 PROCEDURE — 77067 SCR MAMMO BI INCL CAD: CPT

## 2021-12-13 PROCEDURE — 77063 BREAST TOMOSYNTHESIS BI: CPT

## 2021-12-22 ENCOUNTER — APPOINTMENT (OUTPATIENT)
Dept: OBGYN | Facility: CLINIC | Age: 46
End: 2021-12-22
Payer: COMMERCIAL

## 2021-12-22 VITALS
HEIGHT: 67 IN | BODY MASS INDEX: 39.24 KG/M2 | WEIGHT: 250 LBS | SYSTOLIC BLOOD PRESSURE: 130 MMHG | DIASTOLIC BLOOD PRESSURE: 78 MMHG

## 2021-12-22 DIAGNOSIS — R93.89 ABNORMAL FINDINGS ON DIAGNOSTIC IMAGING OF OTHER SPECIFIED BODY STRUCTURES: ICD-10-CM

## 2021-12-22 DIAGNOSIS — Z01.411 ENCOUNTER FOR GYNECOLOGICAL EXAMINATION (GENERAL) (ROUTINE) WITH ABNORMAL FINDINGS: ICD-10-CM

## 2021-12-22 DIAGNOSIS — Z11.51 ENCOUNTER FOR SCREENING FOR HUMAN PAPILLOMAVIRUS (HPV): ICD-10-CM

## 2021-12-22 PROCEDURE — 99213 OFFICE O/P EST LOW 20 MIN: CPT | Mod: 25

## 2021-12-22 PROCEDURE — 99396 PREV VISIT EST AGE 40-64: CPT

## 2021-12-22 NOTE — HISTORY OF PRESENT ILLNESS
[FreeTextEntry1] : Pt is 45yo  LMP 12/15/21 presents for annual. pt still has occ rlq pain when she has period. last sono showed nml ovaries, poss thickened endometrium.\par \par  EXAM:  US TRANSVAGINAL\par \par EXAM:  US PELVIC COMPLETE\par \par \par PROCEDURE DATE:  2021\par \par \par \par INTERPRETATION:  CLINICAL INFORMATION: Fibroids\par \par LMP: 10/12/2021\par \par COMPARISON: 2020\par \par TECHNIQUE:\par Endovaginal and transabdominal pelvic sonogram.\par \par FINDINGS:\par \par Uterus: 11.2 cm x 5.5 cm x 7.7 cm. There is a right-sided fibroid measuring 2.7 x 2.3 x 2.7 cm. There is a right fundal fibroid measuring 2.5 x 2.5 x 3.4 cm. There is a left-sided fibroid measuring 3.0 x 2.5 x 2.2 cm.\par Endometrium: 11 mm with small endometrial cyst.\par \par Right ovary: 4.1 cm x 2.4 cm x 2.2 cm. Within normal limits. Contains follicles.\par Left ovary: 4.9 cm x 2.2 cm x 2.3 cm. Within normal limits. Contains follicles.\par \par Fluid: None.\par \par IMPRESSION:\par Multi fibroid uterus, not significantly changed.\par Cystic change in the endometrial lining. An underlying small polyp cannot be excluded. If clinically warranted, a sonohysterogram may be performed for confirmation.\par Normal ovaries containing follicles.\par \par \par \par --- End of Report ---\par \par \par JANELLE SAUNDERS MD; Attending Radiologist\par This document has been electronically signed. Dec 15 2021  1:00PM\par

## 2021-12-22 NOTE — PLAN
[FreeTextEntry1] : health care maintenance\par -pap\par -vit D/exercise\par -breast mammo/sono up to date\par -colon screening reviewed\par -f/u PCP for annual and appropriate immunizations\par -rto 1 year\par \par possible thickened endometrium\par -SHG rx given\par -pt asx\par -hx polyp

## 2022-08-06 LAB
CYTOLOGY CVX/VAG DOC THIN PREP: NORMAL
HPV HIGH+LOW RISK DNA PNL CVX: NOT DETECTED

## 2025-02-19 NOTE — H&P PST ADULT - WEIGHT IN LBS
Discussion with Dr. Dill, he does not wish to hold tamoxifen.  I called the patient's daughter to discuss these results.  Instructed her to call back as my message was somewhat confusing.  However, Dr. Dill does wish her to remain on tamoxifen.    ENZO Jarrett     [phone]